# Patient Record
Sex: FEMALE | Race: WHITE | NOT HISPANIC OR LATINO | Employment: STUDENT | URBAN - METROPOLITAN AREA
[De-identification: names, ages, dates, MRNs, and addresses within clinical notes are randomized per-mention and may not be internally consistent; named-entity substitution may affect disease eponyms.]

---

## 2022-03-28 LAB
GROUP B STREPTOCOCCUS (EXTERNAL): NEGATIVE
HEPATITIS B SURFACE ANTIGEN (EXTERNAL): NONREACTIVE
HIV1+2 AB SERPL QL IA: NONREACTIVE
RUBELLA ANTIBODY IGG (EXTERNAL): NONREACTIVE
RUBELLA ANTIBODY IGG (EXTERNAL): NORMAL

## 2022-09-27 LAB — GROUP B STREPTOCOCCUS (EXTERNAL): POSITIVE

## 2022-10-28 ENCOUNTER — HOSPITAL ENCOUNTER (INPATIENT)
Facility: CLINIC | Age: 27
LOS: 3 days | Discharge: HOME | End: 2022-10-31
Attending: ADVANCED PRACTICE MIDWIFE | Admitting: ADVANCED PRACTICE MIDWIFE
Payer: COMMERCIAL

## 2022-10-28 DIAGNOSIS — D62 ANEMIA DUE TO BLOOD LOSS, ACUTE: ICD-10-CM

## 2022-10-28 PROBLEM — Z34.90 PREGNANCY: Status: ACTIVE | Noted: 2022-10-28

## 2022-10-28 LAB
ABO/RH(D): NORMAL
ANTIBODY SCREEN: NEGATIVE
SPECIMEN EXPIRATION DATE: NORMAL

## 2022-10-28 PROCEDURE — 86901 BLOOD TYPING SEROLOGIC RH(D): CPT | Performed by: ADVANCED PRACTICE MIDWIFE

## 2022-10-28 PROCEDURE — 120N000001 HC R&B MED SURG/OB

## 2022-10-28 PROCEDURE — 86780 TREPONEMA PALLIDUM: CPT | Performed by: ADVANCED PRACTICE MIDWIFE

## 2022-10-28 PROCEDURE — 86850 RBC ANTIBODY SCREEN: CPT | Performed by: ADVANCED PRACTICE MIDWIFE

## 2022-10-28 PROCEDURE — 250N000011 HC RX IP 250 OP 636: Performed by: ADVANCED PRACTICE MIDWIFE

## 2022-10-28 PROCEDURE — 36415 COLL VENOUS BLD VENIPUNCTURE: CPT | Performed by: ADVANCED PRACTICE MIDWIFE

## 2022-10-28 RX ORDER — PROCHLORPERAZINE 25 MG
25 SUPPOSITORY, RECTAL RECTAL EVERY 12 HOURS PRN
Status: DISCONTINUED | OUTPATIENT
Start: 2022-10-28 | End: 2022-10-29 | Stop reason: HOSPADM

## 2022-10-28 RX ORDER — METHYLERGONOVINE MALEATE 0.2 MG/ML
200 INJECTION INTRAVENOUS
Status: DISCONTINUED | OUTPATIENT
Start: 2022-10-28 | End: 2022-10-29 | Stop reason: HOSPADM

## 2022-10-28 RX ORDER — OXYTOCIN/0.9 % SODIUM CHLORIDE 30/500 ML
100-340 PLASTIC BAG, INJECTION (ML) INTRAVENOUS CONTINUOUS PRN
Status: DISCONTINUED | OUTPATIENT
Start: 2022-10-28 | End: 2022-10-31 | Stop reason: HOSPADM

## 2022-10-28 RX ORDER — IBUPROFEN 800 MG/1
800 TABLET, FILM COATED ORAL
Status: DISCONTINUED | OUTPATIENT
Start: 2022-10-28 | End: 2022-10-31 | Stop reason: HOSPADM

## 2022-10-28 RX ORDER — OXYTOCIN 10 [USP'U]/ML
10 INJECTION, SOLUTION INTRAMUSCULAR; INTRAVENOUS
Status: DISCONTINUED | OUTPATIENT
Start: 2022-10-28 | End: 2022-10-31 | Stop reason: HOSPADM

## 2022-10-28 RX ORDER — METOCLOPRAMIDE 10 MG/1
10 TABLET ORAL EVERY 6 HOURS PRN
Status: DISCONTINUED | OUTPATIENT
Start: 2022-10-28 | End: 2022-10-29 | Stop reason: HOSPADM

## 2022-10-28 RX ORDER — ONDANSETRON 2 MG/ML
4 INJECTION INTRAMUSCULAR; INTRAVENOUS EVERY 6 HOURS PRN
Status: DISCONTINUED | OUTPATIENT
Start: 2022-10-28 | End: 2022-10-29 | Stop reason: HOSPADM

## 2022-10-28 RX ORDER — OXYTOCIN 10 [USP'U]/ML
10 INJECTION, SOLUTION INTRAMUSCULAR; INTRAVENOUS
Status: DISCONTINUED | OUTPATIENT
Start: 2022-10-28 | End: 2022-10-29 | Stop reason: HOSPADM

## 2022-10-28 RX ORDER — PENICILLIN G 3000000 [IU]/50ML
3 INJECTION, SOLUTION INTRAVENOUS EVERY 4 HOURS
Status: DISCONTINUED | OUTPATIENT
Start: 2022-10-28 | End: 2022-10-29 | Stop reason: HOSPADM

## 2022-10-28 RX ORDER — CITRIC ACID/SODIUM CITRATE 334-500MG
30 SOLUTION, ORAL ORAL
Status: DISCONTINUED | OUTPATIENT
Start: 2022-10-28 | End: 2022-10-29 | Stop reason: HOSPADM

## 2022-10-28 RX ORDER — MISOPROSTOL 200 UG/1
400 TABLET ORAL
Status: DISCONTINUED | OUTPATIENT
Start: 2022-10-28 | End: 2022-10-29 | Stop reason: HOSPADM

## 2022-10-28 RX ORDER — ONDANSETRON 4 MG/1
4 TABLET, ORALLY DISINTEGRATING ORAL EVERY 6 HOURS PRN
Status: DISCONTINUED | OUTPATIENT
Start: 2022-10-28 | End: 2022-10-29 | Stop reason: HOSPADM

## 2022-10-28 RX ORDER — NALOXONE HYDROCHLORIDE 0.4 MG/ML
0.2 INJECTION, SOLUTION INTRAMUSCULAR; INTRAVENOUS; SUBCUTANEOUS
Status: DISCONTINUED | OUTPATIENT
Start: 2022-10-28 | End: 2022-10-29 | Stop reason: HOSPADM

## 2022-10-28 RX ORDER — MULTIVITAMIN WITH IRON
1 TABLET ORAL DAILY
COMMUNITY

## 2022-10-28 RX ORDER — MISOPROSTOL 200 UG/1
800 TABLET ORAL
Status: DISCONTINUED | OUTPATIENT
Start: 2022-10-28 | End: 2022-10-29 | Stop reason: HOSPADM

## 2022-10-28 RX ORDER — KETOROLAC TROMETHAMINE 30 MG/ML
30 INJECTION, SOLUTION INTRAMUSCULAR; INTRAVENOUS
Status: DISCONTINUED | OUTPATIENT
Start: 2022-10-28 | End: 2022-10-31 | Stop reason: HOSPADM

## 2022-10-28 RX ORDER — NALOXONE HYDROCHLORIDE 0.4 MG/ML
0.4 INJECTION, SOLUTION INTRAMUSCULAR; INTRAVENOUS; SUBCUTANEOUS
Status: DISCONTINUED | OUTPATIENT
Start: 2022-10-28 | End: 2022-10-29 | Stop reason: HOSPADM

## 2022-10-28 RX ORDER — OXYTOCIN 10 [USP'U]/ML
INJECTION, SOLUTION INTRAMUSCULAR; INTRAVENOUS
Status: DISPENSED
Start: 2022-10-28 | End: 2022-10-29

## 2022-10-28 RX ORDER — PROCHLORPERAZINE MALEATE 10 MG
10 TABLET ORAL EVERY 6 HOURS PRN
Status: DISCONTINUED | OUTPATIENT
Start: 2022-10-28 | End: 2022-10-29 | Stop reason: HOSPADM

## 2022-10-28 RX ORDER — PENICILLIN G POTASSIUM 5000000 [IU]/1
5 INJECTION, POWDER, FOR SOLUTION INTRAMUSCULAR; INTRAVENOUS ONCE
Status: COMPLETED | OUTPATIENT
Start: 2022-10-28 | End: 2022-10-28

## 2022-10-28 RX ORDER — ASPIRIN 81 MG/1
81 TABLET, CHEWABLE ORAL DAILY
Status: ON HOLD | COMMUNITY
End: 2022-10-31

## 2022-10-28 RX ORDER — FENTANYL CITRATE 50 UG/ML
50 INJECTION, SOLUTION INTRAMUSCULAR; INTRAVENOUS EVERY 30 MIN PRN
Status: DISCONTINUED | OUTPATIENT
Start: 2022-10-28 | End: 2022-10-29 | Stop reason: HOSPADM

## 2022-10-28 RX ORDER — OXYTOCIN/0.9 % SODIUM CHLORIDE 30/500 ML
340 PLASTIC BAG, INJECTION (ML) INTRAVENOUS CONTINUOUS PRN
Status: DISCONTINUED | OUTPATIENT
Start: 2022-10-28 | End: 2022-10-29 | Stop reason: HOSPADM

## 2022-10-28 RX ORDER — LIDOCAINE 40 MG/G
CREAM TOPICAL
Status: DISCONTINUED | OUTPATIENT
Start: 2022-10-28 | End: 2022-10-28 | Stop reason: HOSPADM

## 2022-10-28 RX ORDER — CARBOPROST TROMETHAMINE 250 UG/ML
250 INJECTION, SOLUTION INTRAMUSCULAR
Status: DISCONTINUED | OUTPATIENT
Start: 2022-10-28 | End: 2022-10-29 | Stop reason: HOSPADM

## 2022-10-28 RX ORDER — FERROUS SULFATE 325(65) MG
325 TABLET ORAL
COMMUNITY

## 2022-10-28 RX ORDER — MISOPROSTOL 200 UG/1
TABLET ORAL
Status: DISPENSED
Start: 2022-10-28 | End: 2022-10-29

## 2022-10-28 RX ORDER — METOCLOPRAMIDE HYDROCHLORIDE 5 MG/ML
10 INJECTION INTRAMUSCULAR; INTRAVENOUS EVERY 6 HOURS PRN
Status: DISCONTINUED | OUTPATIENT
Start: 2022-10-28 | End: 2022-10-29 | Stop reason: HOSPADM

## 2022-10-28 RX ADMIN — PENICILLIN G 3 MILLION UNITS: 3000000 INJECTION, SOLUTION INTRAVENOUS at 20:12

## 2022-10-28 RX ADMIN — PENICILLIN G POTASSIUM 5 MILLION UNITS: 5000000 POWDER, FOR SOLUTION INTRAMUSCULAR; INTRAPLEURAL; INTRATHECAL; INTRAVENOUS at 16:15

## 2022-10-28 ASSESSMENT — ACTIVITIES OF DAILY LIVING (ADL)
DIFFICULTY_EATING/SWALLOWING: NO
DRESSING/BATHING_DIFFICULTY: NO
ADLS_ACUITY_SCORE: 18
DOING_ERRANDS_INDEPENDENTLY_DIFFICULTY: NO
ADLS_ACUITY_SCORE: 18
WEAR_GLASSES_OR_BLIND: NO
CONCENTRATING,_REMEMBERING_OR_MAKING_DECISIONS_DIFFICULTY: NO
TOILETING_ISSUES: NO
CHANGE_IN_FUNCTIONAL_STATUS_SINCE_ONSET_OF_CURRENT_ILLNESS/INJURY: NO
WALKING_OR_CLIMBING_STAIRS_DIFFICULTY: NO
ADLS_ACUITY_SCORE: 18
FALL_HISTORY_WITHIN_LAST_SIX_MONTHS: NO
ADLS_ACUITY_SCORE: 18

## 2022-10-28 NOTE — H&P
"HISTORY AND PHYSICAL UPDATE ADMISSION EXAM    Name: Ariela Lizarraga  YOB: 1995  Medical Record Number: [id number]    History of Present Illness: Ariela Lizarraga is a 27 year old female who is 40w4d pregnant and being admitted for active labor management.    Estimated Date of Delivery: Oct 24, 2022    EGA 40w4d    OB History    Para Term  AB Living   1 0 0 0 0 0   SAB IAB Ectopic Multiple Live Births   0 0 0 0 0      # Outcome Date GA Lbr Edward/2nd Weight Sex Delivery Anes PTL Lv   1 Current                 Lab Results   Component Value Date    AS Negative 10/28/2022       Prenatal Complications: Rh negative (declined RhoGAM as FOB also Rh negative), TITI from [facility] at 35wks due to insurance canges, GBS negative, COVID 19+ in pregnancy  PMH: Noncontributory  PSH: Tonsillectomy ()    Exam:      Temp 98.2  F (36.8  C) (Oral)   Resp 18   Ht 1.727 m (5' 8\")   Wt 81.9 kg (180 lb 8 oz)   BMI 27.44 kg/m      Fetal heart Rate 125 baseline, moderate variability, +accelerations, no decelerations. Category I tracing  Contractions q2-3 minutes, lasting 60 seconds, palpate moderate, soft uterine resting tone noted    HEENT grossly normal  Neck: no lymphadenopathy or thryoidomegaly  Lungs clear; no respiratory distress  Heart RRR  ABD gravid, non-tender  EXT:  No edema, moves freely  Vaginal exam 4-5/90/0  Membranes: intact    Assessment: active labor management    Plan: Admit - see IP orders  Pain medication may have nitrous, IV fentanyl, or epidural as desires. Plans unmedicated birth okay for IA and hydrotherapy.  Prophylactic antibiotic for + GBS status  Dr. Katz is aware of patient admission and remains available for consultation and collaboration as needed.  Anticipate     Prenatal record reviewed.    YE Baeza CNM      10/28/2022   5:05 PM  "

## 2022-10-28 NOTE — PLAN OF CARE
Report received from Shaina ROSARIO RN. Patient went from left lateral in bed to the tub. Patient has , her mother and spouse at bedside. Breathing well through Ucs, able to relax in between Ucs.

## 2022-10-29 ENCOUNTER — ANESTHESIA EVENT (OUTPATIENT)
Dept: OBGYN | Facility: CLINIC | Age: 27
End: 2022-10-29
Payer: COMMERCIAL

## 2022-10-29 ENCOUNTER — ANESTHESIA (OUTPATIENT)
Dept: OBGYN | Facility: CLINIC | Age: 27
End: 2022-10-29
Payer: COMMERCIAL

## 2022-10-29 LAB
ABO/RH(D): NORMAL
ALBUMIN MFR UR ELPH: 18.5 MG/DL
ALT SERPL W P-5'-P-CCNC: 13 U/L (ref 0–45)
ANTIBODY SCREEN: NEGATIVE
AST SERPL W P-5'-P-CCNC: 23 U/L (ref 0–40)
CREAT SERPL-MCNC: 0.82 MG/DL (ref 0.6–1.1)
CREAT UR-MCNC: 107 MG/DL
ERYTHROCYTE [DISTWIDTH] IN BLOOD BY AUTOMATED COUNT: 13.7 % (ref 10–15)
GFR SERPL CREATININE-BSD FRML MDRD: >90 ML/MIN/1.73M2
HCT VFR BLD AUTO: 30.5 % (ref 35–47)
HGB BLD-MCNC: 10.4 G/DL (ref 11.7–15.7)
MCH RBC QN AUTO: 32.3 PG (ref 26.5–33)
MCHC RBC AUTO-ENTMCNC: 34.1 G/DL (ref 31.5–36.5)
MCV RBC AUTO: 95 FL (ref 78–100)
PLATELET # BLD AUTO: 110 10E3/UL (ref 150–450)
PROT/CREAT 24H UR: 0.17 MG/MG CR
RBC # BLD AUTO: 3.22 10E6/UL (ref 3.8–5.2)
SPECIMEN EXPIRATION DATE: NORMAL
T PALLIDUM AB SER QL: NONREACTIVE
WBC # BLD AUTO: 19.1 10E3/UL (ref 4–11)

## 2022-10-29 PROCEDURE — 10907ZC DRAINAGE OF AMNIOTIC FLUID, THERAPEUTIC FROM PRODUCTS OF CONCEPTION, VIA NATURAL OR ARTIFICIAL OPENING: ICD-10-PCS | Performed by: ADVANCED PRACTICE MIDWIFE

## 2022-10-29 PROCEDURE — 250N000011 HC RX IP 250 OP 636: Performed by: ANESTHESIOLOGY

## 2022-10-29 PROCEDURE — 84460 ALANINE AMINO (ALT) (SGPT): CPT | Performed by: ADVANCED PRACTICE MIDWIFE

## 2022-10-29 PROCEDURE — 85027 COMPLETE CBC AUTOMATED: CPT | Performed by: ADVANCED PRACTICE MIDWIFE

## 2022-10-29 PROCEDURE — 00HU33Z INSERTION OF INFUSION DEVICE INTO SPINAL CANAL, PERCUTANEOUS APPROACH: ICD-10-PCS | Performed by: ANESTHESIOLOGY

## 2022-10-29 PROCEDURE — 86850 RBC ANTIBODY SCREEN: CPT | Performed by: ADVANCED PRACTICE MIDWIFE

## 2022-10-29 PROCEDURE — 36415 COLL VENOUS BLD VENIPUNCTURE: CPT | Performed by: ADVANCED PRACTICE MIDWIFE

## 2022-10-29 PROCEDURE — 84450 TRANSFERASE (AST) (SGOT): CPT | Performed by: ADVANCED PRACTICE MIDWIFE

## 2022-10-29 PROCEDURE — 120N000001 HC R&B MED SURG/OB

## 2022-10-29 PROCEDURE — 250N000011 HC RX IP 250 OP 636: Performed by: ADVANCED PRACTICE MIDWIFE

## 2022-10-29 PROCEDURE — 3E0R3BZ INTRODUCTION OF ANESTHETIC AGENT INTO SPINAL CANAL, PERCUTANEOUS APPROACH: ICD-10-PCS | Performed by: ANESTHESIOLOGY

## 2022-10-29 PROCEDURE — 250N000009 HC RX 250: Performed by: ADVANCED PRACTICE MIDWIFE

## 2022-10-29 PROCEDURE — 722N000001 HC LABOR CARE VAGINAL DELIVERY SINGLE

## 2022-10-29 PROCEDURE — 370N000003 HC ANESTHESIA WARD SERVICE

## 2022-10-29 PROCEDURE — 250N000013 HC RX MED GY IP 250 OP 250 PS 637: Performed by: ADVANCED PRACTICE MIDWIFE

## 2022-10-29 PROCEDURE — 82565 ASSAY OF CREATININE: CPT | Performed by: ADVANCED PRACTICE MIDWIFE

## 2022-10-29 PROCEDURE — 84156 ASSAY OF PROTEIN URINE: CPT | Performed by: ADVANCED PRACTICE MIDWIFE

## 2022-10-29 PROCEDURE — 86901 BLOOD TYPING SEROLOGIC RH(D): CPT | Performed by: ADVANCED PRACTICE MIDWIFE

## 2022-10-29 PROCEDURE — 250N000009 HC RX 250: Performed by: ANESTHESIOLOGY

## 2022-10-29 RX ORDER — EPHEDRINE SULFATE 50 MG/ML
5 INJECTION, SOLUTION INTRAMUSCULAR; INTRAVENOUS; SUBCUTANEOUS
Status: DISCONTINUED | OUTPATIENT
Start: 2022-10-29 | End: 2022-10-29 | Stop reason: HOSPADM

## 2022-10-29 RX ORDER — MAGNESIUM SULFATE HEPTAHYDRATE 40 MG/ML
2 INJECTION, SOLUTION INTRAVENOUS
Status: DISCONTINUED | OUTPATIENT
Start: 2022-10-29 | End: 2022-10-31 | Stop reason: HOSPADM

## 2022-10-29 RX ORDER — NALOXONE HYDROCHLORIDE 0.4 MG/ML
0.4 INJECTION, SOLUTION INTRAMUSCULAR; INTRAVENOUS; SUBCUTANEOUS
Status: DISCONTINUED | OUTPATIENT
Start: 2022-10-29 | End: 2022-10-31 | Stop reason: HOSPADM

## 2022-10-29 RX ORDER — DOCUSATE SODIUM 100 MG/1
100 CAPSULE, LIQUID FILLED ORAL DAILY
Status: DISCONTINUED | OUTPATIENT
Start: 2022-10-29 | End: 2022-10-31 | Stop reason: HOSPADM

## 2022-10-29 RX ORDER — SODIUM CHLORIDE, SODIUM LACTATE, POTASSIUM CHLORIDE, CALCIUM CHLORIDE 600; 310; 30; 20 MG/100ML; MG/100ML; MG/100ML; MG/100ML
10-125 INJECTION, SOLUTION INTRAVENOUS CONTINUOUS
Status: DISCONTINUED | OUTPATIENT
Start: 2022-10-29 | End: 2022-10-31 | Stop reason: HOSPADM

## 2022-10-29 RX ORDER — METHYLERGONOVINE MALEATE 0.2 MG/ML
200 INJECTION INTRAVENOUS
Status: DISCONTINUED | OUTPATIENT
Start: 2022-10-29 | End: 2022-10-31 | Stop reason: HOSPADM

## 2022-10-29 RX ORDER — HYDROCORTISONE 25 MG/G
CREAM TOPICAL 3 TIMES DAILY PRN
Status: DISCONTINUED | OUTPATIENT
Start: 2022-10-29 | End: 2022-10-31 | Stop reason: HOSPADM

## 2022-10-29 RX ORDER — NALOXONE HYDROCHLORIDE 0.4 MG/ML
0.2 INJECTION, SOLUTION INTRAMUSCULAR; INTRAVENOUS; SUBCUTANEOUS
Status: DISCONTINUED | OUTPATIENT
Start: 2022-10-29 | End: 2022-10-31 | Stop reason: HOSPADM

## 2022-10-29 RX ORDER — MAGNESIUM SULFATE HEPTAHYDRATE 500 MG/ML
10 INJECTION, SOLUTION INTRAMUSCULAR; INTRAVENOUS
Status: DISCONTINUED | OUTPATIENT
Start: 2022-10-29 | End: 2022-10-31 | Stop reason: HOSPADM

## 2022-10-29 RX ORDER — BISACODYL 10 MG
10 SUPPOSITORY, RECTAL RECTAL DAILY PRN
Status: DISCONTINUED | OUTPATIENT
Start: 2022-10-29 | End: 2022-10-31 | Stop reason: HOSPADM

## 2022-10-29 RX ORDER — NALBUPHINE HYDROCHLORIDE 10 MG/ML
2.5-5 INJECTION, SOLUTION INTRAMUSCULAR; INTRAVENOUS; SUBCUTANEOUS EVERY 6 HOURS PRN
Status: DISCONTINUED | OUTPATIENT
Start: 2022-10-29 | End: 2022-10-31 | Stop reason: HOSPADM

## 2022-10-29 RX ORDER — CARBOPROST TROMETHAMINE 250 UG/ML
250 INJECTION, SOLUTION INTRAMUSCULAR
Status: DISCONTINUED | OUTPATIENT
Start: 2022-10-29 | End: 2022-10-31 | Stop reason: HOSPADM

## 2022-10-29 RX ORDER — MAGNESIUM SULFATE 4 G/50ML
4 INJECTION INTRAVENOUS
Status: DISCONTINUED | OUTPATIENT
Start: 2022-10-29 | End: 2022-10-31 | Stop reason: HOSPADM

## 2022-10-29 RX ORDER — IBUPROFEN 800 MG/1
800 TABLET, FILM COATED ORAL EVERY 6 HOURS PRN
Status: DISCONTINUED | OUTPATIENT
Start: 2022-10-29 | End: 2022-10-31 | Stop reason: HOSPADM

## 2022-10-29 RX ORDER — MODIFIED LANOLIN
OINTMENT (GRAM) TOPICAL
Status: DISCONTINUED | OUTPATIENT
Start: 2022-10-29 | End: 2022-10-31 | Stop reason: HOSPADM

## 2022-10-29 RX ORDER — LIDOCAINE HCL/EPINEPHRINE/PF 2%-1:200K
VIAL (ML) INJECTION
Status: COMPLETED | OUTPATIENT
Start: 2022-10-29 | End: 2022-10-29

## 2022-10-29 RX ORDER — OXYTOCIN 10 [USP'U]/ML
10 INJECTION, SOLUTION INTRAMUSCULAR; INTRAVENOUS
Status: DISCONTINUED | OUTPATIENT
Start: 2022-10-29 | End: 2022-10-31 | Stop reason: HOSPADM

## 2022-10-29 RX ORDER — LORAZEPAM 2 MG/ML
2 INJECTION INTRAMUSCULAR
Status: DISCONTINUED | OUTPATIENT
Start: 2022-10-29 | End: 2022-10-31 | Stop reason: HOSPADM

## 2022-10-29 RX ORDER — OXYTOCIN/0.9 % SODIUM CHLORIDE 30/500 ML
340 PLASTIC BAG, INJECTION (ML) INTRAVENOUS CONTINUOUS PRN
Status: DISCONTINUED | OUTPATIENT
Start: 2022-10-29 | End: 2022-10-31 | Stop reason: HOSPADM

## 2022-10-29 RX ORDER — OXYCODONE HYDROCHLORIDE 5 MG/1
5 TABLET ORAL EVERY 4 HOURS PRN
Status: DISCONTINUED | OUTPATIENT
Start: 2022-10-29 | End: 2022-10-31 | Stop reason: HOSPADM

## 2022-10-29 RX ORDER — ACETAMINOPHEN 325 MG/1
650 TABLET ORAL EVERY 4 HOURS PRN
Status: DISCONTINUED | OUTPATIENT
Start: 2022-10-29 | End: 2022-10-31 | Stop reason: HOSPADM

## 2022-10-29 RX ORDER — FENTANYL/ROPIVACAINE/NS/PF 2MCG/ML-.1
PLASTIC BAG, INJECTION (ML) EPIDURAL
Status: DISCONTINUED | OUTPATIENT
Start: 2022-10-29 | End: 2022-10-29 | Stop reason: HOSPADM

## 2022-10-29 RX ORDER — MISOPROSTOL 200 UG/1
400 TABLET ORAL
Status: DISCONTINUED | OUTPATIENT
Start: 2022-10-29 | End: 2022-10-31 | Stop reason: HOSPADM

## 2022-10-29 RX ORDER — MISOPROSTOL 200 UG/1
800 TABLET ORAL
Status: DISCONTINUED | OUTPATIENT
Start: 2022-10-29 | End: 2022-10-31 | Stop reason: HOSPADM

## 2022-10-29 RX ADMIN — PENICILLIN G 3 MILLION UNITS: 3000000 INJECTION, SOLUTION INTRAVENOUS at 01:30

## 2022-10-29 RX ADMIN — IBUPROFEN 800 MG: 800 TABLET ORAL at 17:42

## 2022-10-29 RX ADMIN — LIDOCAINE HYDROCHLORIDE,EPINEPHRINE BITARTRATE 5 ML: 20; .005 INJECTION, SOLUTION EPIDURAL; INFILTRATION; INTRACAUDAL; PERINEURAL at 01:46

## 2022-10-29 RX ADMIN — KETOROLAC TROMETHAMINE 30 MG: 30 INJECTION, SOLUTION INTRAMUSCULAR; INTRAVENOUS at 04:47

## 2022-10-29 RX ADMIN — DOCUSATE SODIUM 100 MG: 100 CAPSULE, LIQUID FILLED ORAL at 10:53

## 2022-10-29 RX ADMIN — Medication: at 01:45

## 2022-10-29 RX ADMIN — LIDOCAINE HYDROCHLORIDE 20 ML: 10 INJECTION, SOLUTION INFILTRATION; PERINEURAL at 03:46

## 2022-10-29 RX ADMIN — IBUPROFEN 800 MG: 800 TABLET ORAL at 10:53

## 2022-10-29 RX ADMIN — Medication 340 ML/HR: at 03:39

## 2022-10-29 ASSESSMENT — ACTIVITIES OF DAILY LIVING (ADL)
ADLS_ACUITY_SCORE: 18
ADLS_ACUITY_SCORE: 21
ADLS_ACUITY_SCORE: 18
ADLS_ACUITY_SCORE: 21
ADLS_ACUITY_SCORE: 21
ADLS_ACUITY_SCORE: 18
ADLS_ACUITY_SCORE: 21

## 2022-10-29 NOTE — ANESTHESIA PREPROCEDURE EVALUATION
Anesthesia Pre-Procedure Evaluation    Patient: Ariela Lizarraga   MRN: [id number] : 1995        Procedure :           History reviewed. No pertinent past medical history.   Past Surgical History:   Procedure Laterality Date     TONSILLECTOMY        No Known Allergies   Social History     Tobacco Use     Smoking status: Never     Smokeless tobacco: Never   Substance Use Topics     Alcohol use: Never      Wt Readings from Last 1 Encounters:   10/28/22 81.9 kg (180 lb 8 oz)        Anesthesia Evaluation            ROS/MED HX  ENT/Pulmonary:  - neg pulmonary ROS     Neurologic:  - neg neurologic ROS     Cardiovascular:  - neg cardiovascular ROS     METS/Exercise Tolerance:     Hematologic:  - neg hematologic  ROS     Musculoskeletal:       GI/Hepatic:  - neg GI/hepatic ROS     Renal/Genitourinary:       Endo:  - neg endo ROS     Psychiatric/Substance Use:  - neg psychiatric ROS     Infectious Disease:       Malignancy:       Other:   pregnant         Physical Exam    Airway        Mallampati: II       Respiratory Devices and Support         Dental           Cardiovascular             Pulmonary                   OUTSIDE LABS:  CBC: No results found for: WBC, HGB, HCT, PLT  BMP: No results found for: NA, POTASSIUM, CHLORIDE, CO2, BUN, CR, GLC  COAGS: No results found for: PTT, INR, FIBR  POC: No results found for: BGM, HCG, HCGS  HEPATIC: No results found for: ALBUMIN, PROTTOTAL, ALT, AST, GGT, ALKPHOS, BILITOTAL, BILIDIRECT, DOTTY  OTHER: No results found for: PH, LACT, A1C, BRYAN, PHOS, MAG, LIPASE, AMYLASE, TSH, T4, T3, CRP, SED    Anesthesia Plan    ASA Status:  2      Anesthesia Type: Epidural.              Consents    Anesthesia Plan(s) and associated risks, benefits, and realistic alternatives discussed. Questions answered and patient/representative(s) expressed understanding.    - Discussed:     - Discussed with:  Patient, Spouse         Postoperative Care            Comments:    Other Comments: Patient  requests labor analgesia.  Patient identified.  Medical history reviewed; lab results and allergies reviewed.  Patient interviewed and examined.  Risks(including headache, back pain, low blood pressure, high block and related complications, failed block and/or inadequate analgesia, infection, bleeding and nerve injury), alternatives and procedure discussed and questions answered. Patient gives consent for epidural.  Correct patient, procedure/site verified.Hands washed, sterile gloves and mask worn.            Zuleyma Null MD

## 2022-10-29 NOTE — PLAN OF CARE
Problem: Postpartum (Vaginal Delivery)  Goal: Optimal Pain Control and Function  Outcome: Progressing     Problem: Postpartum (Vaginal Delivery)  Goal: Successful Maternal Role Transition  Outcome: Progressing

## 2022-10-29 NOTE — PLAN OF CARE
Problem: Postpartum (Vaginal Delivery)  Goal: Optimal Pain Control and Function  Outcome: Progressing  Intervention: Prevent or Manage Pain  Recent Flowsheet Documentation  Taken 10/29/2022 0830 by Carolyn Villa RN  Pain Management Interventions:   ambulation/increased activity   repositioned   medication (see MAR)     Problem: Postpartum (Vaginal Delivery)  Goal: Hemostasis  Outcome: Progressing     VS stable. Voiding independently. Light bleeding. Fundus firm. Seen by lactation today. Bonding well with infant. Pain well controlled with prn medications, ice, and tucks.

## 2022-10-29 NOTE — PROGRESS NOTES
"S: Ariela is working hard through her contractions. She is groaning and noting the intensity increasing. She requested to try nitrous, but did not like it, so it was discontinued after a few contractions.    O: BP (!) 145/91   Temp 98.3  F (36.8  C) (Oral)   Resp 18   Ht 1.727 m (5' 8\")   Wt 81.9 kg (180 lb 8 oz)   BMI 27.44 kg/m    EFM: 120 baseline, moderate variability, +accelerations, isolated deceleration into 60s x1 lasting 90 seconds  [place]: q2-3 minutes, lasting 60 seconds, palpate moderate, soft uterine resting tone noted  SVE: 5-6/90/+1    A: 27 year-old  at 40w4d gestation in active labor     P: Reviewed minimal cervical change since admit. Reviewed R/B/A of AROM. She consents. AROM performed returning small amount of meconium stained amniotic fluid. Plan continuous fetal monitoring secondary to meconium. Utilize peanut ball and position changes to assist with fetal descent. Continue to work toward .    Dr. Katz remains available for consultation and collaboration as needed.    YE BaezaM   "

## 2022-10-29 NOTE — ANESTHESIA PROCEDURE NOTES
"Epidural catheter Procedure Note    Pre-Procedure   Staff -        Anesthesiologist:  Zuleyma Null MD       Performed By: anesthesiologist       Location: OB       Procedure Start/Stop Times: 10/29/2022 1:29 AM and 10/29/2022 1:46 AM       Pre-Anesthestic Checklist: patient identified, IV checked, risks and benefits discussed, informed consent, monitors and equipment checked, pre-op evaluation, at physician/surgeon's request and post-op pain management  Timeout:       Correct Patient: Yes        Correct Procedure: Yes        Correct Site: Yes        Correct Position: Yes   Procedure Documentation  Procedure: epidural catheter       Patient Position: sitting       Skin prep: Chloraprep       Local skin infiltrated with 1 mL of 1% lidocaine.        Insertion Site: L2-3. (midline approach).       Technique: LORT saline        Needle Type: Yasir       Needle Gauge: 18.        Needle Length (Inches): 3.5        Catheter: 20 G.          Catheter threaded easily.             # of attempts: 1 and  # of redirects:     Assessment/Narrative         Paresthesias: No.       Test dose of 3 mL lidocaine 1.5% w/ 1:200,000 epinephrine at.         Test dose negative, 3 minutes after injection, for signs of intravascular, subdural, or intrathecal injection.       Insertion/Infusion Method: LORT saline       Aspiration negative for Heme or CSF via Epidural Catheter.    Medication(s) Administered   2% Lidocaine w/ 1:200K Epi (EPIDURAL) - EPIDURAL   5 mL - 10/29/2022 1:46:00 AM  Medication Administration Time: 10/29/2022 1:29 AM     Comments:  Delay in starting procedure due to order set issues and epidural kit issues.  Patient tolerated the procedure well.      FOR [facility] ([facility]) ONLY:   Pain Team Contact information: please page the Pain Team Via [employer]. Search \"Pain\". During daytime hours, please page the attending first. At night please page the resident first.    "

## 2022-10-29 NOTE — LACTATION NOTE
This note was copied from a baby's chart.  Referred to Ariela to assist with a feeding. She reported to have flat nipples and firm breast tissue. A latch was attempted after using the nipple everter but without success. At this point, a NS21 mm was introduced. Even with the NS, baby's tongue was not visible at the gumline. Ariela to start pumping after feeding attempts to offer EBM . May also introduce PDM as needed.   Out pt resources reviewed as well as resources for cranial sacral. Airela to start with family chiropractor for an assessment of baby.To continue ot follow if still inpt on 10-31-22.

## 2022-10-29 NOTE — L&D DELIVERY NOTE
Vaginal Delivery Note    Name: Ariela Lizarraga  : 1995  MRN: [id number]    PRE DELIVERY DIAGNOSIS  1) 27 year old  1 Para 0 at 40w5d      2) Active Labor    POST DELIVERY DIAGNOSIS  1) 27 year old  @ 40w5d  2) Delivery of a viable female infant weighing unavailable at time of dictation via  with APGARS of 8 at one minute and 9 at five minutes    YOB: 2022     Birth Time: 3:37 AM       Augmentation Yes              Indication: ineffective contraction pattern  Induction No                      Indication: none    Monitors: External     GBS: Positive received adequate GBS prophylaxis    ROM: AROM  Fluid Type: Meconium    Labor Analgesia/Anesthesia:Epidural    Cord pH obtained: No  Placenta Date/Time: 10/29/2022  3:53 AM   Placenta submitted to Pathology: no. Patient requested to take home.    Description of procedure:   27 year old  with [facility] / [facility] and pregnancy complicated by Rh negative (declined RhoGAM as FOB also Rh negative), TITI from [facility] at 35wks due to insurance changes, GBS positive, and COVID 19+ in pregnancy presented to L&D with labor contractions.  Upon admission at 1555, cervix was found to be 4-5/90/0.  She labored upright and in the tub. Reexam at 2335, 5cm and AROM performed. She began to feel pushy about 0040, cervix relatively unchanged. She requested an epidural which was placed at 0145 with moderate relief, never really got rid of the pressure/pushy feeling. Cervix an anterior lip/+2 at 0235. She was repositioned and began to push at 0250 and made consistent progress. Fetal head delivered, CARITO, followed by the right anterior shoulder and the remainder of the infant with strong maternal efforts at 0337. Ariela and Osmel welcomed their daughter. Her hospital course was uncomplicated.  Patient progressed to complete with artificial rupture of membranes   Shoulder Dystocia No  Operative Vaginal Delivery No  Infant spontaneously delivered  over an 2nd degree laceration and bilateral sulcal tears.   It was repaired in the normal fashion using local anesthesia in addition to her epidural using 3-0 vicryl. Right angle retraction provided by RN to assist with visualization.  Infant delivered in CARITO position.  Nuchal cord Yes x1   Delivered through    After a 120 second delay, cord clamped x2 and cut by the infant's father.  Placenta spontaneously delivered: 10/29/2022  3:53 AM  grossly intact with 3 vessel cord. Trailing membranes noted and were slowly and gently teased out with a ring forceps and fundal massage. Placenta was thoroughly inspected and found to be intact and membranes appeared complete as well.  Infant:  Live, vigorous infant  was handed to mom.    Delivery was complicated by nothing Interventions included fundal massage and pitocin.    Delivery QBL (mL): 415    Mother and Infant stable.    Dr. Katz was available for consultation and collaboration throughout labor and delivery.    YE Baeza CNM    10/29/2022 4:40 AM

## 2022-10-29 NOTE — PROVIDER NOTIFICATION
Notified STEPHY Naik, ARNALDO over 500ml. Vitally stable. Slightly nauseous/dizzzy when walking to bathroom for first time. Fundus firm. No new orders at this jesi, will continue to monitor.

## 2022-10-29 NOTE — PROGRESS NOTES
Blood pressures noted to be intermittently elevated before and after epidural placement when pushing efforts begun.   Ariela denies headache, vision changes, URQ/epigastric pain.  Will collect HELLP panel at the end of labor.    YE Baeza CNM

## 2022-10-29 NOTE — PLAN OF CARE
Patient continuing to contract regularly, breathing/moaning with Ucs, able to relax in between. Multiple position changes.  Pt states the tub was helpful.  Currently on the ball eating small amounts of dinner tray. Staying hydrated with water. Pt also starting to feel more rectal pressure, but no urge to push yet.

## 2022-10-30 LAB — HGB BLD-MCNC: 8.3 G/DL (ref 11.7–15.7)

## 2022-10-30 PROCEDURE — 250N000013 HC RX MED GY IP 250 OP 250 PS 637: Performed by: ADVANCED PRACTICE MIDWIFE

## 2022-10-30 PROCEDURE — 85018 HEMOGLOBIN: CPT | Performed by: ADVANCED PRACTICE MIDWIFE

## 2022-10-30 PROCEDURE — 36415 COLL VENOUS BLD VENIPUNCTURE: CPT | Performed by: ADVANCED PRACTICE MIDWIFE

## 2022-10-30 PROCEDURE — 120N000001 HC R&B MED SURG/OB

## 2022-10-30 RX ORDER — FERROUS SULFATE 325(65) MG
325 TABLET ORAL DAILY
Status: DISCONTINUED | OUTPATIENT
Start: 2022-10-30 | End: 2022-10-31 | Stop reason: HOSPADM

## 2022-10-30 RX ADMIN — IBUPROFEN 800 MG: 800 TABLET ORAL at 02:13

## 2022-10-30 RX ADMIN — IBUPROFEN 800 MG: 800 TABLET ORAL at 07:58

## 2022-10-30 RX ADMIN — FERROUS SULFATE TAB 325 MG (65 MG ELEMENTAL FE) 325 MG: 325 (65 FE) TAB at 07:59

## 2022-10-30 RX ADMIN — IBUPROFEN 800 MG: 800 TABLET ORAL at 18:13

## 2022-10-30 RX ADMIN — IBUPROFEN 800 MG: 800 TABLET ORAL at 12:08

## 2022-10-30 RX ADMIN — DOCUSATE SODIUM 100 MG: 100 CAPSULE, LIQUID FILLED ORAL at 07:58

## 2022-10-30 RX ADMIN — IBUPROFEN 800 MG: 800 TABLET ORAL at 23:28

## 2022-10-30 ASSESSMENT — ACTIVITIES OF DAILY LIVING (ADL)
ADLS_ACUITY_SCORE: 18

## 2022-10-30 NOTE — PLAN OF CARE
Problem: Plan of Care - These are the overarching goals to be used throughout the patient stay.    Goal: Optimal Comfort and Wellbeing  Intervention: Monitor Pain and Promote Comfort  Recent Flowsheet Documentation  Taken 10/30/2022 1208 by Carolyn Villa, RN  Pain Management Interventions:   medication (see MAR)   ambulation/increased activity   cold applied  Taken 10/30/2022 0758 by Carolyn Villa, RN  Pain Management Interventions:   medication (see MAR)   ambulation/increased activity     VS stable. Fundus firm, bleeding minimal. Perineum sore, tx with prn meds and ice. Began pumping. Bonding well with baby.

## 2022-10-30 NOTE — PLAN OF CARE
Problem: Postpartum (Vaginal Delivery)  Goal: Successful Maternal Role Transition  Outcome: Progressing       Patient hgb recheck after delivery 8.3 down from 10.4. Patient feeling well and wnl. Vitals wnl. Able to take tub bath last night. Taking ibuprofen for pain control, denies wanting tylenol.

## 2022-10-30 NOTE — PROGRESS NOTES
"Vaginal Delivery Postpartum Day 1    Patient Name:  Ariela Lizarraga  :      1995  MRN:      [id number]      Assessment:  Normal postpartum course.  Acute blood loss anemia s/p PPH     Plan:  Continue current care. Start oral iron. Discharge tomorrow.       Subjective:  The patient feels well:  Voiding without difficulty, lochia normal, tolerating normal diet, ambulating without difficulty and passing flatus. Voiding independently without complication. Pain is well controlled with current medications.  The patient has no emotional concerns.  The baby is well and being fed by breast and donor milk. Working through some feeding issues. Denies s/sx of anemia.     YOB: 2022   Birth Time: 3:37 AM     Prenatal Complications include:   Rh negative (declined rhogam as FOB also Rh negative)  GBS pos  COVID 19 in pregnancy    Objective:  /76 (BP Location: Left arm, Patient Position: Right side, Cuff Size: Adult Regular)   Pulse 87   Temp 97.7  F (36.5  C) (Oral)   Resp 16   Ht 1.727 m (5' 8\")   Wt 81.9 kg (180 lb 8 oz)   SpO2 100%   Breastfeeding Unknown   BMI 27.44 kg/m    Patient Vitals for the past 24 hrs:   BP Temp Temp src Pulse Resp SpO2   10/30/22 0800 111/76 97.7  F (36.5  C) Oral 87 16 --   10/30/22 0200 119/79 98.5  F (36.9  C) Oral 75 16 100 %   10/29/22 2130 119/69 98  F (36.7  C) Oral 75 16 100 %   10/29/22 1230 113/70 -- -- 94 -- --       Exam: Patient A&O x 3. No acute distress, breathing unlabored.The amount and color of the lochia is appropriate for the duration of recovery. Feeding baby during my visit. RN notes reviewed.     Lab Results   Component Value Date    AS Negative 10/29/2022    HGB 8.3 (L) 10/30/2022         There is no immunization history on file for this patient.    Provider:  YE Smith CNM    Date:  10/30/2022  Time:  9:11 AM      "

## 2022-10-30 NOTE — ANESTHESIA POSTPROCEDURE EVALUATION
Patient: Ariela Lizarraga    Procedure: * No procedures listed *       Anesthesia Type:  Epidural    Note:  Disposition: Inpatient   Postop Pain Control: Uneventful            Sign Out: Well controlled pain   PONV: No   Neuro/Psych: Uneventful            Sign Out: Acceptable/Baseline neuro status   Airway/Respiratory: Uneventful            Sign Out: Acceptable/Baseline resp. status   CV/Hemodynamics: Uneventful            Sign Out: Acceptable CV status; No obvious hypovolemia; No obvious fluid overload   Other NRE: NONE   DID A NON-ROUTINE EVENT OCCUR? No           Last vitals:  Vitals:    10/29/22 1230 10/29/22 2130 10/30/22 0200   BP: 113/70 119/69 119/79   Pulse: 94 75 75   Resp:  16 16   Temp:  36.7  C (98  F) 36.9  C (98.5  F)   SpO2:  100% 100%     No apparent complications from labor epidural.    Electronically Signed By: Hunter Steele MD

## 2022-10-31 VITALS
HEIGHT: 68 IN | RESPIRATION RATE: 16 BRPM | OXYGEN SATURATION: 98 % | BODY MASS INDEX: 27.35 KG/M2 | WEIGHT: 180.5 LBS | HEART RATE: 88 BPM | DIASTOLIC BLOOD PRESSURE: 79 MMHG | TEMPERATURE: 98.2 F | SYSTOLIC BLOOD PRESSURE: 124 MMHG

## 2022-10-31 PROBLEM — D62 ANEMIA DUE TO BLOOD LOSS, ACUTE: Status: ACTIVE | Noted: 2022-10-31

## 2022-10-31 PROCEDURE — 250N000013 HC RX MED GY IP 250 OP 250 PS 637: Performed by: ADVANCED PRACTICE MIDWIFE

## 2022-10-31 RX ORDER — IBUPROFEN 600 MG/1
600 TABLET, FILM COATED ORAL EVERY 6 HOURS PRN
Qty: 30 TABLET | Refills: 0 | Status: SHIPPED | OUTPATIENT
Start: 2022-10-31

## 2022-10-31 RX ORDER — DOCUSATE SODIUM 100 MG/1
100 CAPSULE, LIQUID FILLED ORAL DAILY
Qty: 30 CAPSULE | Refills: 0 | Status: SHIPPED | OUTPATIENT
Start: 2022-11-01

## 2022-10-31 RX ADMIN — FERROUS SULFATE TAB 325 MG (65 MG ELEMENTAL FE) 325 MG: 325 (65 FE) TAB at 08:45

## 2022-10-31 RX ADMIN — IBUPROFEN 800 MG: 800 TABLET ORAL at 15:15

## 2022-10-31 RX ADMIN — IBUPROFEN 800 MG: 800 TABLET ORAL at 06:28

## 2022-10-31 RX ADMIN — DOCUSATE SODIUM 100 MG: 100 CAPSULE, LIQUID FILLED ORAL at 08:45

## 2022-10-31 ASSESSMENT — ACTIVITIES OF DAILY LIVING (ADL)
ADLS_ACUITY_SCORE: 18

## 2022-10-31 NOTE — LACTATION NOTE
"This note was copied from a baby's chart.  F/u done with Ariela in regard to feedings. With a gloved finger a suck assessment was done. It was noted that baby has a tighter suck. The \"c shaped\" massage at the corners of baby's mouth was shown to parents to help her mouth to relax when at the breast. Ariela mentioned that the latch felt better after doing this. Also, jaw massage was done while baby was at the breast. Even though a NS was used for feeding, baby's tongue was not visible at the gumline. Ariela had been set up with a SNS at the breast with PDM while nursing. Finger feeding with SNS was also mentioned.Resouces were also given to the parents for pediatric dentist if a consult is necessary in the future.   "

## 2022-10-31 NOTE — CONSULTS
Integrative Therapy Consult    Healing PresenceYes  Essential Oils: Topical (EO/Topical Oil)     Crys -  HC, Lavender Massage Oil - HC       Healing Music:       Breathwork:       Guided Imagery:       Acupressure:       Oshibori:       Energy Therapy:       Healing Touch:       Reiki:       Qi Gong:     Massage: Foot      Targeted Massage:    Sleep Promotion:       Other Therapy:       Intervention Reason: Edema     Pre and Post Session Scores: Patient Desires Treatment: yes                             Delivery:         Referrals:      Shannan Aquino

## 2022-10-31 NOTE — PLAN OF CARE
Pt discharged home in stable condition with infant, bands verified against infant bands. Answered all questions and concerns of pt and spouse, both verbalized understanding of discharge instructions and follow up. Instructed to place infant to car seat to ambulate out.

## 2022-10-31 NOTE — DISCHARGE SUMMARY
OB Discharge Summary      Date:  10/31/2022    Name:  Ariela Lizarraga  :  1995  MRN:  [id number]      Admission Date:  10/28/2022  Delivery Date: 10/29/2022   Gestational Age at Delivery:  40w5d  Discharge Date:  10/31/2022    Principal Diagnosis:    Patient Active Problem List   Diagnosis     Pregnancy     Anemia due to blood loss, acute         Conditions complicating Pregnancy: Rh negative (declined RhoGAM as FOB also Rh negative), TITI from [facility] at 35wks due to insurance canges, GBS negative, COVID 19+ in pregnancy    Procedure(s) Performed:   with 2nd degree laceration and bilateral sulcus lac.    Indication for :  NA  Indication for Induction:  NA     Condition at Discharge:  Stable, ready to discharge home. C/O mild-mod sacrum pain. Fundus at 1 below U, lochia WNL, pain controlled with current medications, no difficulty voiding or stooling.    Discharge Medications:      Review of your medicines      START taking      Dose / Directions   docusate sodium 100 MG capsule  Commonly known as: COLACE  Used for:  (normal spontaneous vaginal delivery)      Dose: 100 mg  Start taking on: 2022  Take 1 capsule (100 mg) by mouth daily  Quantity: 30 capsule  Refills: 0     ibuprofen 600 MG tablet  Commonly known as: ADVIL/MOTRIN  Used for:  (normal spontaneous vaginal delivery)      Dose: 600 mg  Take 1 tablet (600 mg) by mouth every 6 hours as needed for moderate pain (For mild to moderate pain.)  Quantity: 30 tablet  Refills: 0        CONTINUE these medicines which have NOT CHANGED      Dose / Directions   ferrous sulfate 325 (65 Fe) MG tablet  Commonly known as: FEROSUL      Dose: 325 mg  Take 325 mg by mouth daily (with breakfast)  Refills: 0     magnesium 250 MG tablet      Dose: 1 tablet  Take 1 tablet by mouth daily  Refills: 0     prenatal multivitamin  plus iron 27-1 MG Tabs      Take by mouth daily  Refills: 0        STOP taking    aspirin 81 MG chewable  tablet  Commonly known as: ASA              Where to get your medicines      Some of these will need a paper prescription and others can be bought over the counter. Ask your nurse if you have questions.    Bring a paper prescription for each of these medications    docusate sodium 100 MG capsule    ibuprofen 600 MG tablet          Discharge Plan:    Follow up with /STEPHY:  6 weeks PP   Patient Instructions:      Physical activity: as tolerated    Diet:  As tolerated    Medication: as discussed    Other:        Physician/CNM: YE Russell CNM    Name:  Ariela Lizarraga  :  1995  MRN:  [id number]

## 2022-10-31 NOTE — PROGRESS NOTES
"Outreach Note for [facility]    Chart reviewed, discharge plan discussed with patient, needs assessed. Patient verbalizes understanding of plan, requests [facility] as ordered, [facility] nurse visit planned for , Home Care Intake updated. Patient and , \"Eliose\", phone number is reported as correct in EMR.    Patient states she has good support at home, has baby care essentials, and feels ready to discharge today. Outreach RN will continue to follow and assist if needed with discharge plan. No additional needs identified at this time.        "

## 2022-10-31 NOTE — DISCHARGE INSTRUCTIONS
A Homecare Visit is set up on Wed, Nov 2nd. The RN will call you after 4 p.m. the evening before the visit with a time. Please do not make a clinic visit for the same day as your Homecare Visit. You can contact [facility] at [phone] if you have any further questions related to the home visit.      Postpartum Vaginal Delivery Instructions    Activity     Ask family and friends for help when you need it.  Do not place anything in your vagina for 6 weeks.  You are not restricted on other activities, but take it easy for a few weeks to allow your body to recover from delivery.  You are able to do any activities you feel up to that point.  No driving until you have stopped taking your pain medications (usually two weeks after delivery).     Call your health care provider if you have any of these symptoms:     Increased pain, swelling, redness, or fluid around your stiches from an episiotomy or perineal tear.  A fever above 100.4 F (38 C) with or without chills when placing a thermometer under your tongue.  You soak a sanitary pad with blood within 1 hour, or you see blood clots larger than a golf ball.  Bleeding that lasts more than 6 weeks.  Vaginal discharge that smells bad.  Severe pain, cramping or tenderness in your lower belly area.  A need to urinate more frequently (use the toilet more often), more urgently (use the toilet very quickly), or it burns when you urinate.  Nausea and vomiting.  Redness, swelling or pain around a vein in your leg.  Problems breastfeeding or a red or painful area on your breast.  Chest pain and cough or are gasping for air.  Problems coping with sadness, anxiety, or depression.  If you have any concerns about hurting yourself or the baby, call your provider immediately.   You have questions or concerns after you return home.     Keep your hands clean:  Always wash your hands before touching your perineal area and stitches.  This helps reduce your risk of infection.  If  your hands aren't dirty, you may use an alcohol hand-rub to clean your hands. Keep your nails clean and short.

## 2022-10-31 NOTE — PLAN OF CARE
Problem: Plan of Care - These are the overarching goals to be used throughout the patient stay.    Goal: Plan of Care Review  Description: The Plan of Care Review/Shift note should be completed every shift.  The Outcome Evaluation is a brief statement about your assessment that the patient is improving, declining, or no change.  This information will be displayed automatically on your shift note.  Outcome: Met     Pt VSS, fundus firm with scant bleeding. Pain controlled with ordered ibuprofen. Pt is up independently in room and voiding well. Pt is breastfeeding baby and using 15-20 donor breastmilk after each feeding. Attentive to all  cares.

## 2022-10-31 NOTE — PLAN OF CARE
Pt VS stable, pain well controlled with oral pain medication, independent with self and infant feeds, awaiting acupuncture and then will be discharged home. Will continue to monitor and provide support.

## 2022-10-31 NOTE — CONSULTS
"ACUPUNCTURIST TREATMENT NOTE    Name: Ariela Lizarraga  :  1995  MRN:  [id number]    Acupuncture Treatment  Patient Type: Maternity  Intervention Reason: Pain, Lactation  Pain Location: Low rosie  Pre-session Pain Ratin  Post-session Pain Rating: 3  Patient complaint:: Pain of low back and sacrum, insufficient lactation  Initial insertions: Ambrocio 17, Gb 21, Si 1, (R) Si 3, (R) Ling gu, (L) Bl 60, 62, 65, (L) hand kyung x1  Number of needles inserted: 11  Number of needles removed: 11         \"Risks and benefits of acupuncture were discussed with patient. Consent for treatment was given. We thank you for the referral.\"     Tahir Díaz    Date:  10/31/2022  Time:  3:26 PM    "